# Patient Record
Sex: MALE | Race: BLACK OR AFRICAN AMERICAN | NOT HISPANIC OR LATINO | Employment: OTHER | URBAN - METROPOLITAN AREA
[De-identification: names, ages, dates, MRNs, and addresses within clinical notes are randomized per-mention and may not be internally consistent; named-entity substitution may affect disease eponyms.]

---

## 2019-05-23 ENCOUNTER — TELEPHONE (OUTPATIENT)
Dept: TRANSPLANT | Facility: CLINIC | Age: 64
End: 2019-05-23

## 2019-05-23 DIAGNOSIS — I50.9 CONGESTIVE HEART FAILURE, UNSPECIFIED HF CHRONICITY, UNSPECIFIED HEART FAILURE TYPE: Primary | ICD-10-CM

## 2019-05-23 NOTE — TELEPHONE ENCOUNTER
Called to schedule consult appointment. No answer. Unable to leave message as voicemail not set up yet. Records from referring provider scanned into [employer] and sent to be scanned into Epic.

## 2019-05-27 NOTE — TELEPHONE ENCOUNTER
REFERRAL NOTE:    Rigo Palencia has been referred to the pre-heart transplant office for consideration for orthotopic heart transplantation by KAMILA Odom. Patient's appointments have been scheduled for 07/22/19 as per patients request to come in at the end of July to allow himself time to arrange transportation either through insurance or son who is a . Sooner appointment offered. AHF/ Transplant Handout, appointment letter and campus map was mailed to the patient. My contact information was given. Call PRN. Dr Odom's office informed of appointment as scheduled with Dr Feldman via fax.

## 2019-07-22 ENCOUNTER — HOSPITAL ENCOUNTER (OUTPATIENT)
Dept: PULMONOLOGY | Facility: CLINIC | Age: 64
Discharge: HOME | End: 2019-07-22
Payer: MEDICARE

## 2019-07-22 ENCOUNTER — INITIAL CONSULT (OUTPATIENT)
Dept: TRANSPLANT | Facility: CLINIC | Age: 64
End: 2019-07-22
Payer: MEDICARE

## 2019-07-22 ENCOUNTER — HOSPITAL ENCOUNTER (OUTPATIENT)
Dept: CARDIOLOGY | Facility: CLINIC | Age: 64
Discharge: HOME | End: 2019-07-22
Attending: INTERNAL MEDICINE
Payer: MEDICARE

## 2019-07-22 VITALS
WEIGHT: 207 LBS | HEIGHT: 75 IN | HEIGHT: 75 IN | BODY MASS INDEX: 25.74 KG/M2 | OXYGEN SATURATION: 97 % | DIASTOLIC BLOOD PRESSURE: 66 MMHG | SYSTOLIC BLOOD PRESSURE: 99 MMHG | HEART RATE: 61 BPM | WEIGHT: 207.88 LBS | BODY MASS INDEX: 25.85 KG/M2

## 2019-07-22 VITALS
HEART RATE: 54 BPM | WEIGHT: 207 LBS | SYSTOLIC BLOOD PRESSURE: 118 MMHG | BODY MASS INDEX: 25.74 KG/M2 | HEIGHT: 75 IN | DIASTOLIC BLOOD PRESSURE: 75 MMHG

## 2019-07-22 DIAGNOSIS — Z95.810 BIVENTRICULAR AUTOMATIC IMPLANTABLE CARDIOVERTER DEFIBRILLATOR IN SITU: ICD-10-CM

## 2019-07-22 DIAGNOSIS — I50.9 CONGESTIVE HEART FAILURE, UNSPECIFIED HF CHRONICITY, UNSPECIFIED HEART FAILURE TYPE: ICD-10-CM

## 2019-07-22 DIAGNOSIS — I25.10 CORONARY ARTERY DISEASE INVOLVING NATIVE CORONARY ARTERY OF NATIVE HEART WITHOUT ANGINA PECTORIS: ICD-10-CM

## 2019-07-22 DIAGNOSIS — I50.42 CHRONIC COMBINED SYSTOLIC AND DIASTOLIC CONGESTIVE HEART FAILURE: Primary | ICD-10-CM

## 2019-07-22 DIAGNOSIS — I25.5 ISCHEMIC CARDIOMYOPATHY: ICD-10-CM

## 2019-07-22 DIAGNOSIS — I48.0 PAROXYSMAL ATRIAL FIBRILLATION: ICD-10-CM

## 2019-07-22 LAB
ASCENDING AORTA: 3.56 CM
AV INDEX (PROSTH): 0.5
AV MEAN GRADIENT: 4 MMHG
AV PEAK GRADIENT: 6 MMHG
AV VALVE AREA: 2.01 CM2
AV VELOCITY RATIO: 0.54
BSA FOR ECHO PROCEDURE: 2.23 M2
CV ECHO LV RWT: 0.22 CM
DOP CALC AO PEAK VEL: 1.23 M/S
DOP CALC AO VTI: 30.91 CM
DOP CALC LVOT AREA: 4 CM2
DOP CALC LVOT DIAMETER: 2.27 CM
DOP CALC LVOT PEAK VEL: 0.67 M/S
DOP CALC LVOT STROKE VOLUME: 62.13 CM3
DOP CALCLVOT PEAK VEL VTI: 15.36 CM
E WAVE DECELERATION TIME: 192.63 MSEC
E/A RATIO: 0.8
E/E' RATIO: 12.33 M/S
ECHO LV POSTERIOR WALL: 0.82 CM (ref 0.6–1.1)
FRACTIONAL SHORTENING: 4 % (ref 28–44)
INTERVENTRICULAR SEPTUM: 0.85 CM (ref 0.6–1.1)
LA MAJOR: 5.24 CM
LA MINOR: 5.35 CM
LA WIDTH: 4.09 CM
LEFT ATRIUM SIZE: 4.8 CM
LEFT ATRIUM VOLUME INDEX: 39.7 ML/M2
LEFT ATRIUM VOLUME: 88.35 CM3
LEFT INTERNAL DIMENSION IN SYSTOLE: 7 CM (ref 2.1–4)
LEFT VENTRICLE DIASTOLIC VOLUME INDEX: 125.98 ML/M2
LEFT VENTRICLE DIASTOLIC VOLUME: 280.51 ML
LEFT VENTRICLE MASS INDEX: 125 G/M2
LEFT VENTRICLE SYSTOLIC VOLUME INDEX: 93.4 ML/M2
LEFT VENTRICLE SYSTOLIC VOLUME: 207.97 ML
LEFT VENTRICULAR INTERNAL DIMENSION IN DIASTOLE: 7.3 CM (ref 3.5–6)
LEFT VENTRICULAR MASS: 277.42 G
LV LATERAL E/E' RATIO: 10.57 M/S
LV SEPTAL E/E' RATIO: 14.8 M/S
MV PEAK A VEL: 0.92 M/S
MV PEAK E VEL: 0.74 M/S
PISA TR MAX VEL: 2.65 M/S
PULM VEIN S/D RATIO: 1.34
PV PEAK D VEL: 0.32 M/S
PV PEAK S VEL: 0.43 M/S
RA MAJOR: 4.61 CM
RA PRESSURE: 3 MMHG
RA WIDTH: 3.58 CM
RIGHT VENTRICULAR END-DIASTOLIC DIMENSION: 3.54 CM
RV TISSUE DOPPLER FREE WALL SYSTOLIC VELOCITY 1 (APICAL 4 CHAMBER VIEW): 12.1 CM/S
SINUS: 3.81 CM
STJ: 3.81 CM
TDI LATERAL: 0.07 M/S
TDI SEPTAL: 0.05 M/S
TDI: 0.06 M/S
TR MAX PG: 28 MMHG
TRICUSPID ANNULAR PLANE SYSTOLIC EXCURSION: 1.51 CM
TV REST PULMONARY ARTERY PRESSURE: 31 MMHG

## 2019-07-22 PROCEDURE — 94618 PULMONARY STRESS TESTING: CPT | Mod: 26,S$PBB,NTX, | Performed by: INTERNAL MEDICINE

## 2019-07-22 PROCEDURE — 94618 PULMONARY STRESS TESTING: ICD-10-PCS | Mod: 26,S$PBB,NTX, | Performed by: INTERNAL MEDICINE

## 2019-07-22 PROCEDURE — 94618 PULMONARY STRESS TESTING: CPT | Mod: PBBFAC,NTX | Performed by: INTERNAL MEDICINE

## 2019-07-22 PROCEDURE — 99204 PR OFFICE/OUTPT VISIT, NEW, LEVL IV, 45-59 MIN: ICD-10-PCS | Mod: S$PBB,NTX,, | Performed by: INTERNAL MEDICINE

## 2019-07-22 PROCEDURE — 99204 OFFICE O/P NEW MOD 45 MIN: CPT | Mod: S$PBB,NTX,, | Performed by: INTERNAL MEDICINE

## 2019-07-22 PROCEDURE — 93306 TRANSTHORACIC ECHO (TTE) COMPLETE (CUPID ONLY): ICD-10-PCS | Mod: 26,S$PBB,NTX, | Performed by: INTERNAL MEDICINE

## 2019-07-22 PROCEDURE — 99999 PR PBB SHADOW E&M-EST. PATIENT-LVL III: CPT | Mod: PBBFAC,TXP,, | Performed by: INTERNAL MEDICINE

## 2019-07-22 PROCEDURE — 93306 TTE W/DOPPLER COMPLETE: CPT | Mod: PBBFAC,NTX | Performed by: INTERNAL MEDICINE

## 2019-07-22 PROCEDURE — 99999 PR PBB SHADOW E&M-EST. PATIENT-LVL III: ICD-10-PCS | Mod: PBBFAC,TXP,, | Performed by: INTERNAL MEDICINE

## 2019-07-22 PROCEDURE — 99213 OFFICE O/P EST LOW 20 MIN: CPT | Mod: PBBFAC,TXP | Performed by: INTERNAL MEDICINE

## 2019-07-22 RX ORDER — PREDNISONE 20 MG/1
20 TABLET ORAL DAILY
Refills: 3 | COMMUNITY
Start: 2019-06-22 | End: 2020-02-14 | Stop reason: DRUGHIGH

## 2019-07-22 RX ORDER — CARVEDILOL 12.5 MG/1
12.5 TABLET ORAL 2 TIMES DAILY
Refills: 3 | COMMUNITY
Start: 2019-06-21

## 2019-07-22 RX ORDER — SACUBITRIL AND VALSARTAN 24; 26 MG/1; MG/1
1 TABLET, FILM COATED ORAL DAILY
Refills: 3 | COMMUNITY
Start: 2019-06-19

## 2019-07-22 RX ORDER — ISOSORBIDE MONONITRATE 30 MG/1
30 TABLET, EXTENDED RELEASE ORAL DAILY
Refills: 3 | COMMUNITY
Start: 2019-06-21

## 2019-07-22 RX ORDER — LEVOTHYROXINE SODIUM 75 UG/1
75 TABLET ORAL
COMMUNITY

## 2019-07-22 RX ORDER — FUROSEMIDE 80 MG/1
80 TABLET ORAL 2 TIMES DAILY
Refills: 5 | COMMUNITY
Start: 2019-07-04 | End: 2019-08-07

## 2019-07-22 RX ORDER — APIXABAN 5 MG/1
5 TABLET, FILM COATED ORAL 2 TIMES DAILY
Refills: 3 | COMMUNITY
Start: 2019-06-19

## 2019-07-22 RX ORDER — PRAVASTATIN SODIUM 10 MG/1
10 TABLET ORAL DAILY
COMMUNITY
End: 2020-02-14 | Stop reason: DRUGHIGH

## 2019-07-22 RX ORDER — FUROSEMIDE 80 MG/1
40 TABLET ORAL EVERY OTHER DAY
COMMUNITY

## 2019-07-22 NOTE — LETTER
July 22, 2019        KAMILA Odom  [address] [place] [address]  Phone: [phone]  Fax: [phone]             [facility] [address]  [facility] [address]  Phone: [phone]   Patient: Rigo Palencia   MR Number: [id number]   YOB: 1955   Date of Visit: 7/22/2019       Dear Dr. KAMILA Odom    Thank you for referring Rigo Palencia to me for evaluation. Attached you will find relevant portions of my assessment and plan of care.    If you have questions, please do not hesitate to call me. I look forward to following Rigo Palencia along with you.    Sincerely,    Sunny Feldman MD    Enclosure    If you would like to receive this communication electronically, please contact [email or web address] or [phone] to request [employer] Link access.    [employer] Link is a tool which provides read-only access to select patient information with whom you have a relationship. Its easy to use and provides real time access to review your patients record including encounter summaries, notes, results, and demographic information.    If you feel you have received this communication in error or would no longer like to receive these types of communications, please e-mail [email or web address]

## 2019-07-22 NOTE — PROCEDURES
Rigo Palencia is a 63 y.o.  male patient, who presents for a 6 minute walk test ordered by MD Gaston.  The diagnosis is Pre Heart Transplant.  The patient's BMI is 25.9 kg/m2.  Predicted distance (lower limit of normal) is 404.48 meters.      Test Results:    The test was completed without stopping.    The total time walked was 360 seconds.  During walking, the patient reported:  Dyspnea, Leg pain. The patient used no assistive devices  during testing.     07/22/2019---------Distance: 365.76 meters (1200 feet)     O2 Sat % Supplemental Oxygen Heart Rate Blood Pressure Cici Scale   Pre-exercise  (Resting) 97 % Room Air 59 bpm 118/77 mmHg 2   During Exercise 95 % Room Air 69 bpm 125/66 mmHg 3   Post-exercise  (Recovery) 97 % Room Air  62 bpm   mmHg       Recovery Time: 84 seconds    Performing nurse/tech: Alex SANCHEZ      PREVIOUS STUDY:   The patient has not had a previous study.      CLINICAL INTERPRETATION:  Six minute walk distance is 365.76 meters (1200 feet) with moderate dyspnea.  During exercise, there was no significant desaturation while breathing room air.  Both blood pressure and heart rate remained stable with walking.  Bradycardia was present prior to exercise.  The patient reported non-pulmonary symptoms during exercise.  No previous study performed.  Based upon age and body mass index, exercise capacity is less than predicted.

## 2019-07-22 NOTE — PROGRESS NOTES
Lab order faxed to [facility] in [place] for BMP on 7/26.  Pt given copy of lab order as well.

## 2019-07-22 NOTE — PROGRESS NOTES
Subjective:   Transplant status: active    HPI:  Mr. Palencia is a 63 y.o. year old black male with stage C HFrEF, ICMP, CAD s/p PCI, BIV-ICD who is referred for evaluation for advanced HF options. Clinically reports NYHA class III symptoms. Occasional PND and orthopnea. His regimen includes; entresto 24/26 mg BID, coreg 12.5 mg BID and lasix 40 mg BID on MWF. His labs were reviewed today.     Past Medical History:   Diagnosis Date    Biventricular automatic implantable cardioverter defibrillator in situ 7/22/2019    CAD (coronary artery disease)     Chronic combined systolic and diastolic congestive heart failure     Coronary artery disease involving native coronary artery of native heart without angina pectoris 7/22/2019    Dizziness and giddiness     Fatigue     Heart attack 2018    High cholesterol     History of retroperitoneal fibrosis     HTN (hypertension)     ICD (implantable cardioverter-defibrillator) battery depletion     Ischemic cardiomyopathy     Ischemic cardiomyopathy 7/22/2019    Kidney disease, chronic, stage II (mild, EGFR 60+ ml/min) 03/10/2011    Kidney disease, chronic, stage III (GFR 30-59 ml/min) 03/2011    Long term current use of antiarrhythmic drug     Paroxysmal atrial fibrillation     Paroxysmal atrial fibrillation 7/22/2019    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 Negative for chills, decreased appetite, diaphoresis, fever, malaise/fatigue, night sweats, weight gain and weight loss.   Eyes: Negative.    Cardiovascular: Positive for dyspnea on exertion. Negative for chest pain, claudication, cyanosis, irregular heartbeat, leg swelling, near-syncope, orthopnea, palpitations, paroxysmal  "nocturnal dyspnea and syncope.   Respiratory: Negative for cough, hemoptysis and shortness of breath.    Endocrine: Negative.    Hematologic/Lymphatic: Negative.    Skin: Negative for color change, dry skin and nail changes.   Musculoskeletal: Negative.    Gastrointestinal: Negative.    Genitourinary: Negative.    Neurological: Negative for weakness.       Objective:   Blood pressure 99/66, pulse 61, height 6' 2.5" (1.892 m), weight 94.3 kg (207 lb 14.3 oz), SpO2 97 %.body mass index is 26.33 kg/m².  Physical Exam   Constitutional: He appears well-developed.   BP 99/66 (BP Location: Left arm, Patient Position: Sitting, BP Method: Medium (Automatic))   Pulse 61   Ht 6' 2.5" (1.892 m)   Wt 94.3 kg (207 lb 14.3 oz)   SpO2 97%   BMI 26.33 kg/m²      HENT:   Head: Normocephalic.   Neck: No JVD present. Carotid bruit is not present.   Cardiovascular: Regular rhythm, normal heart sounds and normal pulses.   No murmur heard.  Pulmonary/Chest: Effort normal and breath sounds normal. No respiratory distress. He has no wheezes. He has no rales.   Abdominal: Soft. Bowel sounds are normal. He exhibits no distension. There is no tenderness. There is no rebound.   Musculoskeletal: He exhibits no edema.   Neurological: He is alert.   Skin: Skin is warm.   Vitals reviewed.      Labs:    Chemistry        Component Value Date/Time     07/22/2019 1033    K 3.5 07/22/2019 1033     07/22/2019 1033    CO2 25 07/22/2019 1033    BUN 26 (H) 07/22/2019 1033    CREATININE 2.3 (H) 07/22/2019 1033     (H) 07/22/2019 1033        Component Value Date/Time    CALCIUM 9.1 07/22/2019 1033    ESTGFRAFRICA 33.7 (A) 07/22/2019 1033    EGFRNONAA 29.1 (A) 07/22/2019 1033          No results found for: MG  Lab Results   Component Value Date    WBC 5.22 07/22/2019    HGB 11.6 (L) 07/22/2019    HCT 39.7 (L) 07/22/2019     07/22/2019     No results found for: INR, PROTIME  BNP   Date Value Ref Range Status   07/22/2019 1,009 (H) " 0 - 99 pg/mL Final     Comment:     Values of less than 100 pg/ml are consistent with non-CHF populations.       Assessment:      1. Ischemic cardiomyopathy    2. Coronary artery disease involving native coronary artery of native heart without angina pectoris    3. Biventricular automatic implantable cardioverter defibrillator in situ    4. Paroxysmal atrial fibrillation        Plan:   In summary Mr. Palencia is a very pleasant 62 y/o black male with stage C HFrEF, ICMP, NYHA class III symptoms with recent HF admissions.   Recommend 2D echo with CFD to assess his LV size and function and filling pressures  I am worried about his worsening kidney function. he appears euvolemic so will decrease his lasix to 40 mg.  Will review his echo and give further recs.  Recommend 2 gram sodium restriction and 1500cc fluid restriction.  Encourage physical activity with graded exercise program.  Requested patient to weigh themselves daily, and to notify us if their weight increases by more than 3 lbs in 1 day or 5 lbs in 1 week.   BMP Friday to be done locally.   RTC in 12 weeks.    Sunny Feldman MD

## 2019-07-26 DIAGNOSIS — I50.42 CHRONIC COMBINED SYSTOLIC AND DIASTOLIC CONGESTIVE HEART FAILURE: Primary | ICD-10-CM

## 2019-07-26 RX ORDER — POTASSIUM CHLORIDE 20 MEQ/1
40 TABLET, EXTENDED RELEASE ORAL 2 TIMES DAILY
Qty: 150 TABLET | Refills: 6 | Status: SHIPPED | OUTPATIENT
Start: 2019-07-26

## 2019-07-26 NOTE — TELEPHONE ENCOUNTER
Reviewed outside labs 7/25/19 with Dr Feldman.  Creat down slightly to 2.1.  K low at 3.3    Per Dr Feldman , pt to continue with lasix 40 every other day.   Order received for KCL 60meq today then 40meq bid starting tomorrow.  rx entered and sent to MD for signaure.     Instructed pt on med changes. He voiced understanding by repeating directions.

## 2019-08-07 ENCOUNTER — RESEARCH ENCOUNTER (OUTPATIENT)
Dept: RESEARCH | Facility: HOSPITAL | Age: 64
End: 2019-08-07

## 2019-08-07 ENCOUNTER — OFFICE VISIT (OUTPATIENT)
Dept: TRANSPLANT | Facility: CLINIC | Age: 64
End: 2019-08-07
Payer: MEDICARE

## 2019-08-07 VITALS
HEART RATE: 58 BPM | SYSTOLIC BLOOD PRESSURE: 122 MMHG | BODY MASS INDEX: 26.42 KG/M2 | WEIGHT: 212.5 LBS | DIASTOLIC BLOOD PRESSURE: 78 MMHG | HEIGHT: 75 IN

## 2019-08-07 DIAGNOSIS — I50.42 CHRONIC COMBINED SYSTOLIC AND DIASTOLIC CONGESTIVE HEART FAILURE: Primary | ICD-10-CM

## 2019-08-07 DIAGNOSIS — I25.5 ISCHEMIC CARDIOMYOPATHY: Primary | ICD-10-CM

## 2019-08-07 DIAGNOSIS — Z95.810 BIVENTRICULAR AUTOMATIC IMPLANTABLE CARDIOVERTER DEFIBRILLATOR IN SITU: ICD-10-CM

## 2019-08-07 DIAGNOSIS — I48.0 PAROXYSMAL ATRIAL FIBRILLATION: ICD-10-CM

## 2019-08-07 DIAGNOSIS — I25.5 ISCHEMIC CARDIOMYOPATHY: ICD-10-CM

## 2019-08-07 DIAGNOSIS — I25.10 CORONARY ARTERY DISEASE INVOLVING NATIVE CORONARY ARTERY OF NATIVE HEART WITHOUT ANGINA PECTORIS: ICD-10-CM

## 2019-08-07 PROCEDURE — 99213 OFFICE O/P EST LOW 20 MIN: CPT | Mod: PBBFAC,NTX | Performed by: INTERNAL MEDICINE

## 2019-08-07 PROCEDURE — 99999 PR PBB SHADOW E&M-EST. PATIENT-LVL III: ICD-10-PCS | Mod: PBBFAC,TXP,, | Performed by: INTERNAL MEDICINE

## 2019-08-07 PROCEDURE — 99999 PR PBB SHADOW E&M-EST. PATIENT-LVL III: CPT | Mod: PBBFAC,TXP,, | Performed by: INTERNAL MEDICINE

## 2019-08-07 PROCEDURE — 99214 PR OFFICE/OUTPT VISIT, EST, LEVL IV, 30-39 MIN: ICD-10-PCS | Mod: S$PBB,,, | Performed by: INTERNAL MEDICINE

## 2019-08-07 PROCEDURE — 99214 OFFICE O/P EST MOD 30 MIN: CPT | Mod: S$PBB,,, | Performed by: INTERNAL MEDICINE

## 2019-08-07 NOTE — PROGRESS NOTES
Date Consent signed: 8/7/2019     Sponsor: [employer]     Study Title/IRB Number: [id number]     Principal Investigator: Sunny Feldman MD     Present for Discussion: CRC, patient, patient's wife      Is LAR Consenting for Subject: No     Prior to the Informed Consent (IC) being signed, or any study protocol required data collection, testing, procedure, or intervention being performed, the following was done and/or discussed:  · Patient was given a copy of the IC for review   · Purpose of the study and qualifications to participate   · Study design, Follow up schedule, and tests or procedures done at each visit  · Confidentiality and HIPAA Authorization for Release of Medical Records for the research trial/ subject's rights/research related injury  · Risk, Benefits, Alternative Treatments, Compensation and Costs  · Participation in the research trial is voluntary and patient may withdraw at anytime  · Contact information for study related questions     Patient verbalizes understanding of the above: Yes  Contact information for CRC and PI given to patient: Yes  Patient able to adequately summarize: the purpose of the study, the risks associated with the study, and all procedures, testing, and follow-ups associated with the study: Yes     Mr. Palencia signed the informed consent form for the GUIDE-MARIOLA research study with an IRB approval date of 4/3/2018. Each page of the consent form was reviewed with patient and patient's wife, and all questions answered satisfactorily. Mr. Palencia signed the consent form and received a copy of same. The original consent was scanned into electronic medical records (EPIC) and filed into the subject's research study binder.     Screening/baseline procedures initiated per protocol following obtaining consent. EQ-5D-5L and KCCQ-12 questionnaires completed prior to all screening/baseline procedures per protocol. Inclusion/exclusion reviewed and confirmed with Dr. Feldman. Medical history and  heart failure medications reviewed and confirmed. Six minute landa walk test conducted by CRC per protocol. Patient walked a total of 240 meters and had to stop prior to completion of the 6 minutes. Patient walked for a total of 3 minutes and 35 seconds. Patient denied complaints before the test, but reported shortness of breath and fatigue during and after test and vital signs remained stable (Before: BP 138/63 HR 55 O2 97% on room air; After: BP 132/64 HR 61 O2 96% on room air). Remaining baseline procedures (HF exam with NYHA and VS and labs) will be performed on the day of implant upon checking into [facility]. Confirmed with patient that Medicare is primary insurance, therefore, pre-authorization is not required for the study implant. However, Verification of Benefits to be obtained with  to contact patient prior to procedure to notify patient of financial obligation. Complete understanding verbalized, and patient in agreement with plan. Patient to be scheduled for implant once  contacts patient. Patient in agreement with plan and understanding verbalized. CRC contact information given to patient. Instructed patient to call with any questions or concerns. Understanding verbalized. Will follow.

## 2019-08-07 NOTE — PROGRESS NOTES
Subjective:   Transplant status: active    HPI:  Mr. Palencia is a 63 y.o. year old black male with stage C HFrEF, ICMP, CAD s/p PCI, BIV-ICD who comes for a follow-up visit. Clinically reports NYHA class III symptoms. Occasional PND and orthopnea. His regimen includes; entresto 24/26 mg BID, coreg 12.5 mg BID and lasix 40 mg BID on MWF. His labs were reviewed today.     Past Medical History:   Diagnosis Date    Biventricular automatic implantable cardioverter defibrillator in situ 7/22/2019    CAD (coronary artery disease)     Chronic combined systolic and diastolic congestive heart failure     Coronary artery disease involving native coronary artery of native heart without angina pectoris 7/22/2019    Dizziness and giddiness     Fatigue     Heart attack 2018    High cholesterol     History of retroperitoneal fibrosis     HTN (hypertension)     ICD (implantable cardioverter-defibrillator) battery depletion     Ischemic cardiomyopathy     Ischemic cardiomyopathy 7/22/2019    Kidney disease, chronic, stage II (mild, EGFR 60+ ml/min) 03/10/2011    Kidney disease, chronic, stage III (GFR 30-59 ml/min) 03/2011    Long term current use of antiarrhythmic drug     Paroxysmal atrial fibrillation     Paroxysmal atrial fibrillation 7/22/2019    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 Negative for chills, decreased appetite, diaphoresis, fever, malaise/fatigue, night sweats, weight gain and weight loss.   Eyes: Negative.    Cardiovascular: Negative for chest pain, claudication, cyanosis, dyspnea on exertion, irregular heartbeat, leg swelling, near-syncope, orthopnea, palpitations, paroxysmal nocturnal dyspnea and syncope.  "  Respiratory: Negative for cough, hemoptysis and shortness of breath.    Endocrine: Negative.    Hematologic/Lymphatic: Negative.    Skin: Negative for color change, dry skin and nail changes.   Musculoskeletal: Negative.    Gastrointestinal: Negative.    Genitourinary: Negative.    Neurological: Negative for weakness.       Objective:   Blood pressure 122/78, pulse (!) 58, height 6' 3" (1.905 m), weight 96.4 kg (212 lb 8.4 oz).body mass index is 26.56 kg/m².  Physical Exam   Constitutional: He appears well-developed.   /78 (BP Location: Left arm, Patient Position: Sitting, BP Method: Medium (Automatic))   Pulse (!) 58   Ht 6' 3" (1.905 m)   Wt 96.4 kg (212 lb 8.4 oz)   BMI 26.56 kg/m²      HENT:   Head: Normocephalic.   Neck: No JVD present. Carotid bruit is not present.   Cardiovascular: Regular rhythm, normal heart sounds and normal pulses.   No murmur heard.  Pulmonary/Chest: Effort normal and breath sounds normal.   Abdominal: Soft. Bowel sounds are normal.   Neurological: He is alert.   Skin: Skin is warm.   Vitals reviewed.      Labs:    Chemistry        Component Value Date/Time     08/07/2019 1020    K 4.2 08/07/2019 1020     08/07/2019 1020    CO2 25 08/07/2019 1020    BUN 31 (H) 08/07/2019 1020    CREATININE 2.1 (H) 08/07/2019 1020    GLU 96 08/07/2019 1020        Component Value Date/Time    CALCIUM 9.1 07/22/2019 1033    ESTGFRAFRICA 33.7 (A) 07/22/2019 1033    EGFRNONAA 29.1 (A) 07/22/2019 1033          No results found for: MG  Lab Results   Component Value Date    WBC 5.22 07/22/2019    HGB 11.6 (L) 07/22/2019    HCT 39.7 (L) 07/22/2019     07/22/2019     No results found for: INR, PROTIME  BNP   Date Value Ref Range Status   07/22/2019 1,009 (H) 0 - 99 pg/mL Final     Comment:     Values of less than 100 pg/ml are consistent with non-CHF populations.       Assessment:      1. Chronic combined systolic and diastolic congestive heart failure    2. Coronary artery disease " involving native coronary artery of native heart without angina pectoris    3. Ischemic cardiomyopathy    4. Biventricular automatic implantable cardioverter defibrillator in situ    5. Paroxysmal atrial fibrillation        Plan:   In summary Mr. Paelncia is a very pleasant 62 y/o black male with stage C HFrEF, ICMP, NYHA class III symptoms with elevated BNP.  In view of his NYHA class symptoms and elevated BNP he meets criteria for GUIDE HF study. He has been on a stable HF regimen and does not need advanced HF options at this time.   May move to HF clinic  Patient met with our CRC Naz Benitez to go over the study protocol  Recommend 2 gram sodium restriction and 1500cc fluid restriction.  Encourage physical activity with graded exercise program.  Requested patient to weigh themselves daily, and to notify us if their weight increases by more than 3 lbs in 1 day or 5 lbs in 1 week.   RTC in 2 months      Sunny Feldman MD

## 2019-08-07 NOTE — LETTER
August 7, 2019        KAMILA Odom  [address] [facility] [address]  Phone: [phone]  Fax: [phone]             [facility] [address]  [facility] [address]  Phone: [phone]   Patient: Rigo Palencia   MR Number: [id number]   YOB: 1955   Date of Visit: 8/7/2019       Dear Dr. KAMILA Odom    Thank you for referring Rigo Palencia to me for evaluation. Attached you will find relevant portions of my assessment and plan of care.    If you have questions, please do not hesitate to call me. I look forward to following Rigo Palencia along with you.    Sincerely,    Sunny Feldman MD    Enclosure    If you would like to receive this communication electronically, please contact [email or web address] or [phone] to request [employer] access.    [employer] is a tool which provides read-only access to select patient information with whom you have a relationship. Its easy to use and provides real time access to review your patients record including encounter summaries, notes, results, and demographic information.    If you feel you have received this communication in error or would no longer like to receive these types of communications, please e-mail [email or web address]

## 2019-08-12 ENCOUNTER — RESEARCH ENCOUNTER (OUTPATIENT)
Dept: RESEARCH | Facility: HOSPITAL | Age: 64
End: 2019-08-12

## 2019-08-12 NOTE — PROGRESS NOTES
Patient withdrawn from participating in the GUIDE-HF clinic trial. Patient had signed consent on 8/7/2019, however, decided that he did not want to proceed with the implant. Patient informed that he would be withdrawn from study, and study participation is complete. Patient in agreement and complete understanding verbalized. Patient denies any questions or concerns. Patient withdrawn from study in Epic and On[employer]. Dr. Feldman notified.

## 2019-10-02 ENCOUNTER — OFFICE VISIT (OUTPATIENT)
Dept: TRANSPLANT | Facility: CLINIC | Age: 64
End: 2019-10-02
Payer: MEDICARE

## 2019-10-02 ENCOUNTER — LAB VISIT (OUTPATIENT)
Dept: LAB | Facility: HOSPITAL | Age: 64
End: 2019-10-02
Attending: INTERNAL MEDICINE
Payer: MEDICARE

## 2019-10-02 VITALS
DIASTOLIC BLOOD PRESSURE: 68 MMHG | HEART RATE: 60 BPM | HEIGHT: 75 IN | SYSTOLIC BLOOD PRESSURE: 116 MMHG | WEIGHT: 213.19 LBS | BODY MASS INDEX: 26.51 KG/M2 | OXYGEN SATURATION: 94 %

## 2019-10-02 DIAGNOSIS — I50.42 CHRONIC COMBINED SYSTOLIC AND DIASTOLIC CONGESTIVE HEART FAILURE: Primary | ICD-10-CM

## 2019-10-02 DIAGNOSIS — Z95.810 BIVENTRICULAR AUTOMATIC IMPLANTABLE CARDIOVERTER DEFIBRILLATOR IN SITU: ICD-10-CM

## 2019-10-02 DIAGNOSIS — I25.5 ISCHEMIC CARDIOMYOPATHY: ICD-10-CM

## 2019-10-02 DIAGNOSIS — I25.10 CORONARY ARTERY DISEASE INVOLVING NATIVE CORONARY ARTERY OF NATIVE HEART WITHOUT ANGINA PECTORIS: ICD-10-CM

## 2019-10-02 LAB
ALBUMIN SERPL BCP-MCNC: 3.6 G/DL (ref 3.5–5.2)
ALP SERPL-CCNC: 77 U/L (ref 55–135)
ALT SERPL W/O P-5'-P-CCNC: 10 U/L (ref 10–44)
ANION GAP SERPL CALC-SCNC: 12 MMOL/L (ref 8–16)
AST SERPL-CCNC: 11 U/L (ref 10–40)
BASOPHILS # BLD AUTO: 0.02 K/UL (ref 0–0.2)
BASOPHILS NFR BLD: 0.4 % (ref 0–1.9)
BILIRUB SERPL-MCNC: 0.3 MG/DL (ref 0.1–1)
BNP SERPL-MCNC: 716 PG/ML (ref 0–99)
BUN SERPL-MCNC: 32 MG/DL (ref 8–23)
CALCIUM SERPL-MCNC: 8.9 MG/DL (ref 8.7–10.5)
CHLORIDE SERPL-SCNC: 107 MMOL/L (ref 95–110)
CO2 SERPL-SCNC: 26 MMOL/L (ref 23–29)
CREAT SERPL-MCNC: 2.4 MG/DL (ref 0.5–1.4)
DIFFERENTIAL METHOD: ABNORMAL
EOSINOPHIL # BLD AUTO: 0.1 K/UL (ref 0–0.5)
EOSINOPHIL NFR BLD: 0.9 % (ref 0–8)
ERYTHROCYTE [DISTWIDTH] IN BLOOD BY AUTOMATED COUNT: 17.7 % (ref 11.5–14.5)
EST. GFR  (AFRICAN AMERICAN): 32 ML/MIN/1.73 M^2
EST. GFR  (NON AFRICAN AMERICAN): 27.7 ML/MIN/1.73 M^2
GLUCOSE SERPL-MCNC: 106 MG/DL (ref 70–110)
HCT VFR BLD AUTO: 38.9 % (ref 40–54)
HGB BLD-MCNC: 12.1 G/DL (ref 14–18)
IMM GRANULOCYTES # BLD AUTO: 0.02 K/UL (ref 0–0.04)
IMM GRANULOCYTES NFR BLD AUTO: 0.4 % (ref 0–0.5)
LYMPHOCYTES # BLD AUTO: 1.5 K/UL (ref 1–4.8)
LYMPHOCYTES NFR BLD: 26.7 % (ref 18–48)
MCH RBC QN AUTO: 26.9 PG (ref 27–31)
MCHC RBC AUTO-ENTMCNC: 31.1 G/DL (ref 32–36)
MCV RBC AUTO: 87 FL (ref 82–98)
MONOCYTES # BLD AUTO: 0.5 K/UL (ref 0.3–1)
MONOCYTES NFR BLD: 9.1 % (ref 4–15)
NEUTROPHILS # BLD AUTO: 3.6 K/UL (ref 1.8–7.7)
NEUTROPHILS NFR BLD: 62.5 % (ref 38–73)
NRBC BLD-RTO: 0 /100 WBC
PLATELET # BLD AUTO: 170 K/UL (ref 150–350)
PMV BLD AUTO: 11.7 FL (ref 9.2–12.9)
POTASSIUM SERPL-SCNC: 3.4 MMOL/L (ref 3.5–5.1)
PROT SERPL-MCNC: 6.9 G/DL (ref 6–8.4)
RBC # BLD AUTO: 4.49 M/UL (ref 4.6–6.2)
SODIUM SERPL-SCNC: 145 MMOL/L (ref 136–145)
WBC # BLD AUTO: 5.69 K/UL (ref 3.9–12.7)

## 2019-10-02 PROCEDURE — 99999 PR PBB SHADOW E&M-EST. PATIENT-LVL III: CPT | Mod: PBBFAC,,, | Performed by: INTERNAL MEDICINE

## 2019-10-02 PROCEDURE — 80053 COMPREHEN METABOLIC PANEL: CPT

## 2019-10-02 PROCEDURE — 83880 ASSAY OF NATRIURETIC PEPTIDE: CPT

## 2019-10-02 PROCEDURE — 99999 PR PBB SHADOW E&M-EST. PATIENT-LVL III: ICD-10-PCS | Mod: PBBFAC,,, | Performed by: INTERNAL MEDICINE

## 2019-10-02 PROCEDURE — 85025 COMPLETE CBC W/AUTO DIFF WBC: CPT

## 2019-10-02 PROCEDURE — 36415 COLL VENOUS BLD VENIPUNCTURE: CPT

## 2019-10-02 PROCEDURE — 99214 OFFICE O/P EST MOD 30 MIN: CPT | Mod: S$PBB,,, | Performed by: INTERNAL MEDICINE

## 2019-10-02 PROCEDURE — 99214 PR OFFICE/OUTPT VISIT, EST, LEVL IV, 30-39 MIN: ICD-10-PCS | Mod: S$PBB,,, | Performed by: INTERNAL MEDICINE

## 2019-10-02 PROCEDURE — 99213 OFFICE O/P EST LOW 20 MIN: CPT | Mod: PBBFAC | Performed by: INTERNAL MEDICINE

## 2019-10-02 NOTE — LETTER
October 2, 2019        KAMILA Odom  [address] [facility] [address]  Phone: [phone]  Fax: [phone]             [facility] [address]  [facility] [address]  Phone: [phone]   Patient: Rigo Palencia   MR Number: [id number]   YOB: 1955   Date of Visit: 10/2/2019       Dear Dr. KAMILA Odom    Thank you for referring Rigo Palencia to me for evaluation. Attached you will find relevant portions of my assessment and plan of care.    If you have questions, please do not hesitate to call me. I look forward to following Rigo Palencia along with you.    Sincerely,    Sunny Feldman MD    Enclosure    If you would like to receive this communication electronically, please contact [email or web address] or [phone] to request [employer] Link access.    [employer] Link is a tool which provides read-only access to select patient information with whom you have a relationship. Its easy to use and provides real time access to review your patients record including encounter summaries, notes, results, and demographic information.    If you feel you have received this communication in error or would no longer like to receive these types of communications, please e-mail [email or web address]

## 2019-10-02 NOTE — PROGRESS NOTES
Subjective:     HPI:  Mr. Palencia is a 63 y.o. year old black male with stage C HFrEF, ICMP, CAD s/p PCI, BIV-ICD who comes for a follow-up visit. Clinically reports NYHA class III symptoms. Occasional PND and orthopnea. His regimen includes; entresto 24/26 mg BID, coreg 12.5 mg BID and lasix 40 mg BID on MWF. His labs are still pending.     Past Medical History:   Diagnosis Date    Biventricular automatic implantable cardioverter defibrillator in situ 7/22/2019    CAD (coronary artery disease)     Chronic combined systolic and diastolic congestive heart failure     Coronary artery disease involving native coronary artery of native heart without angina pectoris 7/22/2019    Dizziness and giddiness     Fatigue     Heart attack 2018    High cholesterol     History of retroperitoneal fibrosis     HTN (hypertension)     ICD (implantable cardioverter-defibrillator) battery depletion     Ischemic cardiomyopathy     Ischemic cardiomyopathy 7/22/2019    Kidney disease, chronic, stage II (mild, EGFR 60+ ml/min) 03/10/2011    Kidney disease, chronic, stage III (GFR 30-59 ml/min) 03/2011    Long term current use of antiarrhythmic drug     Paroxysmal atrial fibrillation     Paroxysmal atrial fibrillation 7/22/2019    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 Negative for chills, decreased appetite, diaphoresis, fever, malaise/fatigue, night sweats, weight gain and weight loss.   Eyes: Negative.    Cardiovascular: Positive for dyspnea on exertion. Negative for chest pain, claudication, cyanosis, irregular heartbeat, leg swelling, near-syncope, orthopnea, palpitations, paroxysmal nocturnal dyspnea and syncope.   Respiratory: Negative for  "cough, hemoptysis and shortness of breath.    Endocrine: Negative.    Hematologic/Lymphatic: Negative.    Skin: Negative for color change, dry skin and nail changes.   Musculoskeletal: Negative.    Gastrointestinal: Negative.    Genitourinary: Negative.    Neurological: Negative for weakness.       Objective:   Blood pressure 116/68, pulse 60, height 6' 3" (1.905 m), weight 96.7 kg (213 lb 3 oz), SpO2 (!) 94 %.body mass index is 26.65 kg/m².  Physical Exam   Constitutional: He appears well-developed.   /68 (BP Location: Left arm, Patient Position: Sitting, BP Method: Large (Automatic))   Pulse 60   Ht 6' 3" (1.905 m)   Wt 96.7 kg (213 lb 3 oz)   SpO2 (!) 94%   BMI 26.65 kg/m²      HENT:   Head: Normocephalic.   Neck: No JVD present. Carotid bruit is not present.   Cardiovascular: Regular rhythm, normal heart sounds and normal pulses. PMI is displaced.   No murmur heard.  Pulmonary/Chest: Effort normal and breath sounds normal. No respiratory distress. He has no wheezes. He has no rales.   Abdominal: Soft. Bowel sounds are normal. He exhibits no distension. There is no tenderness. There is no rebound.   Musculoskeletal: He exhibits no edema.   Neurological: He is alert.   Skin: Skin is warm.   Vitals reviewed.      Labs:    Chemistry        Component Value Date/Time     08/07/2019 1020    K 4.2 08/07/2019 1020     08/07/2019 1020    CO2 25 08/07/2019 1020    BUN 31 (H) 08/07/2019 1020    CREATININE 2.1 (H) 08/07/2019 1020    GLU 96 08/07/2019 1020        Component Value Date/Time    CALCIUM 8.9 08/07/2019 1020    ESTGFRAFRICA 37.6 (A) 08/07/2019 1020    EGFRNONAA 32.5 (A) 08/07/2019 1020          No results found for: MG  Lab Results   Component Value Date    WBC 5.69 10/02/2019    HGB 12.1 (L) 10/02/2019    HCT 38.9 (L) 10/02/2019     10/02/2019     No results found for: INR, PROTIME  BNP   Date Value Ref Range Status   08/07/2019 1,350 (H) 0 - 99 pg/mL Final     Comment:     Values of " less than 100 pg/ml are consistent with non-CHF populations.   07/22/2019 1,009 (H) 0 - 99 pg/mL Final     Comment:     Values of less than 100 pg/ml are consistent with non-CHF populations.       Assessment:      1. Chronic combined systolic and diastolic congestive heart failure    2. Coronary artery disease involving native coronary artery of native heart without angina pectoris    3. Ischemic cardiomyopathy    4. Biventricular automatic implantable cardioverter defibrillator in situ        Plan:   Clinically doing well. NYHA class II-III symptoms. Euvolemic on exam.   Continue current HF regimen   Recommend 2 gram sodium restriction and 1500cc fluid restriction.  Encourage physical activity with graded exercise program.  Requested patient to weigh themselves daily, and to notify us if their weight increases by more than 3 lbs in 1 day or 5 lbs in 1 week.   RTC in 3 months with labs.     Sunny Feldman MD

## 2020-02-14 ENCOUNTER — OFFICE VISIT (OUTPATIENT)
Dept: TRANSPLANT | Facility: CLINIC | Age: 65
End: 2020-02-14
Payer: MEDICARE

## 2020-02-14 ENCOUNTER — LAB VISIT (OUTPATIENT)
Dept: LAB | Facility: HOSPITAL | Age: 65
End: 2020-02-14
Attending: INTERNAL MEDICINE
Payer: MEDICARE

## 2020-02-14 VITALS
HEIGHT: 75 IN | HEART RATE: 54 BPM | BODY MASS INDEX: 26.67 KG/M2 | WEIGHT: 214.5 LBS | SYSTOLIC BLOOD PRESSURE: 137 MMHG | DIASTOLIC BLOOD PRESSURE: 84 MMHG

## 2020-02-14 DIAGNOSIS — I50.42 CHRONIC COMBINED SYSTOLIC AND DIASTOLIC CONGESTIVE HEART FAILURE: Primary | ICD-10-CM

## 2020-02-14 DIAGNOSIS — I50.42 CHRONIC COMBINED SYSTOLIC AND DIASTOLIC CONGESTIVE HEART FAILURE: ICD-10-CM

## 2020-02-14 DIAGNOSIS — I25.10 CORONARY ARTERY DISEASE INVOLVING NATIVE CORONARY ARTERY OF NATIVE HEART WITHOUT ANGINA PECTORIS: ICD-10-CM

## 2020-02-14 DIAGNOSIS — I48.0 PAROXYSMAL ATRIAL FIBRILLATION: ICD-10-CM

## 2020-02-14 DIAGNOSIS — Z95.810 BIVENTRICULAR AUTOMATIC IMPLANTABLE CARDIOVERTER DEFIBRILLATOR IN SITU: ICD-10-CM

## 2020-02-14 DIAGNOSIS — I25.5 ISCHEMIC CARDIOMYOPATHY: ICD-10-CM

## 2020-02-14 LAB
ALBUMIN SERPL BCP-MCNC: 3.5 G/DL (ref 3.5–5.2)
ALP SERPL-CCNC: 73 U/L (ref 55–135)
ALT SERPL W/O P-5'-P-CCNC: 9 U/L (ref 10–44)
ANION GAP SERPL CALC-SCNC: 11 MMOL/L (ref 8–16)
AST SERPL-CCNC: 13 U/L (ref 10–40)
BILIRUB SERPL-MCNC: 0.3 MG/DL (ref 0.1–1)
BUN SERPL-MCNC: 27 MG/DL (ref 8–23)
CALCIUM SERPL-MCNC: 9.3 MG/DL (ref 8.7–10.5)
CHLORIDE SERPL-SCNC: 108 MMOL/L (ref 95–110)
CO2 SERPL-SCNC: 24 MMOL/L (ref 23–29)
CREAT SERPL-MCNC: 2.1 MG/DL (ref 0.5–1.4)
EST. GFR  (AFRICAN AMERICAN): 37.3 ML/MIN/1.73 M^2
EST. GFR  (NON AFRICAN AMERICAN): 32.3 ML/MIN/1.73 M^2
GLUCOSE SERPL-MCNC: 90 MG/DL (ref 70–110)
POTASSIUM SERPL-SCNC: 4.3 MMOL/L (ref 3.5–5.1)
PROT SERPL-MCNC: 6.8 G/DL (ref 6–8.4)
SODIUM SERPL-SCNC: 143 MMOL/L (ref 136–145)

## 2020-02-14 PROCEDURE — 99999 PR PBB SHADOW E&M-EST. PATIENT-LVL III: ICD-10-PCS | Mod: PBBFAC,,, | Performed by: INTERNAL MEDICINE

## 2020-02-14 PROCEDURE — 99214 OFFICE O/P EST MOD 30 MIN: CPT | Mod: S$PBB,,, | Performed by: INTERNAL MEDICINE

## 2020-02-14 PROCEDURE — 99214 PR OFFICE/OUTPT VISIT, EST, LEVL IV, 30-39 MIN: ICD-10-PCS | Mod: S$PBB,,, | Performed by: INTERNAL MEDICINE

## 2020-02-14 PROCEDURE — 80053 COMPREHEN METABOLIC PANEL: CPT

## 2020-02-14 PROCEDURE — 99999 PR PBB SHADOW E&M-EST. PATIENT-LVL III: CPT | Mod: PBBFAC,,, | Performed by: INTERNAL MEDICINE

## 2020-02-14 PROCEDURE — 36415 COLL VENOUS BLD VENIPUNCTURE: CPT

## 2020-02-14 PROCEDURE — 99213 OFFICE O/P EST LOW 20 MIN: CPT | Mod: PBBFAC | Performed by: INTERNAL MEDICINE

## 2020-02-14 RX ORDER — PRAVASTATIN SODIUM 20 MG/1
20 TABLET ORAL DAILY
COMMUNITY
Start: 2019-12-19

## 2020-02-14 RX ORDER — PREDNISONE 10 MG/1
10 TABLET ORAL DAILY
COMMUNITY
Start: 2019-11-25

## 2020-02-14 RX ORDER — AMIODARONE HYDROCHLORIDE 200 MG/1
200 TABLET ORAL DAILY
COMMUNITY
Start: 2020-01-06

## 2020-02-14 RX ORDER — PREDNISOLONE ACETATE 10 MG/ML
SUSPENSION/ DROPS OPHTHALMIC
COMMUNITY
Start: 2020-02-03

## 2020-02-14 NOTE — PROGRESS NOTES
Subjective:     HPI:  Mr. Palencia is a 63 y.o. year old black male with stage C HFrEF, ICMP, CAD s/p PCI, BIV-ICD who comes for a follow-up visit. Clinically continues to do well. Reports NYHA class II-III symptoms. Occasional PND and orthopnea. His regimen includes; entresto 24/26 mg BID, coreg 12.5 mg BID and lasix 40 mg BID on MWF.  Her reports no recent HF admissions or ED visits. Labs today are still pending.     Past Medical History:   Diagnosis Date    Biventricular automatic implantable cardioverter defibrillator in situ 7/22/2019    CAD (coronary artery disease)     Chronic combined systolic and diastolic congestive heart failure     Coronary artery disease involving native coronary artery of native heart without angina pectoris 7/22/2019    Dizziness and giddiness     Fatigue     Heart attack 2018    High cholesterol     History of retroperitoneal fibrosis     HTN (hypertension)     ICD (implantable cardioverter-defibrillator) battery depletion     Ischemic cardiomyopathy     Ischemic cardiomyopathy 7/22/2019    Kidney disease, chronic, stage II (mild, EGFR 60+ ml/min) 03/10/2011    Kidney disease, chronic, stage III (GFR 30-59 ml/min) 03/2011    Long term current use of antiarrhythmic drug     Paroxysmal atrial fibrillation     Paroxysmal atrial fibrillation 7/22/2019    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 Negative for chills, decreased appetite, diaphoresis, fever, malaise/fatigue, night sweats, weight gain and weight loss.   Eyes: Negative.    Cardiovascular: Positive for dyspnea on exertion. Negative for chest pain, claudication, cyanosis, irregular heartbeat, leg swelling, near-syncope, orthopnea,  "palpitations, paroxysmal nocturnal dyspnea and syncope.   Respiratory: Negative for cough, hemoptysis and shortness of breath.    Endocrine: Negative.    Hematologic/Lymphatic: Negative.    Skin: Negative for color change, dry skin and nail changes.   Musculoskeletal: Negative.    Gastrointestinal: Negative.    Genitourinary: Negative.    Neurological: Negative for weakness.       Objective:   Blood pressure 137/84, pulse (!) 54, height 6' 3" (1.905 m), weight 97.3 kg (214 lb 8.1 oz).body mass index is 26.81 kg/m².  Physical Exam   Constitutional: He appears well-developed.   /84 (BP Location: Left arm, Patient Position: Sitting, BP Method: Medium (Automatic))   Pulse (!) 54   Ht 6' 3" (1.905 m)   Wt 97.3 kg (214 lb 8.1 oz)   BMI 26.81 kg/m²      HENT:   Head: Normocephalic.   Neck: No JVD present. Carotid bruit is not present.   Cardiovascular: Regular rhythm, normal heart sounds and normal pulses. PMI is displaced.   No murmur heard.  Pulmonary/Chest: Effort normal and breath sounds normal. No respiratory distress. He has no wheezes. He has no rales.   Abdominal: Soft. Bowel sounds are normal. He exhibits no distension. There is no tenderness. There is no rebound.   Musculoskeletal: He exhibits no edema.   Neurological: He is alert.   Skin: Skin is warm.   Vitals reviewed.      Labs:    Chemistry        Component Value Date/Time     10/02/2019 0955    K 3.4 (L) 10/02/2019 0955     10/02/2019 0955    CO2 26 10/02/2019 0955    BUN 32 (H) 10/02/2019 0955    CREATININE 2.4 (H) 10/02/2019 0955     10/02/2019 0955        Component Value Date/Time    CALCIUM 8.9 10/02/2019 0955    ALKPHOS 77 10/02/2019 0955    AST 11 10/02/2019 0955    ALT 10 10/02/2019 0955    BILITOT 0.3 10/02/2019 0955    ESTGFRAFRICA 32.0 (A) 10/02/2019 0955    EGFRNONAA 27.7 (A) 10/02/2019 0955          No results found for: MG  Lab Results   Component Value Date    WBC 5.69 10/02/2019    HGB 12.1 (L) 10/02/2019    HCT " 38.9 (L) 10/02/2019     10/02/2019     No results found for: INR, PROTIME  BNP   Date Value Ref Range Status   10/02/2019 716 (H) 0 - 99 pg/mL Final     Comment:     Values of less than 100 pg/ml are consistent with non-CHF populations.   08/07/2019 1,350 (H) 0 - 99 pg/mL Final     Comment:     Values of less than 100 pg/ml are consistent with non-CHF populations.   07/22/2019 1,009 (H) 0 - 99 pg/mL Final     Comment:     Values of less than 100 pg/ml are consistent with non-CHF populations.       Assessment:      1. Chronic combined systolic and diastolic congestive heart failure    2. Ischemic cardiomyopathy    3. Coronary artery disease involving native coronary artery of native heart without angina pectoris    4. Paroxysmal atrial fibrillation    5. Biventricular automatic implantable cardioverter defibrillator in situ        Plan:   Clinically doing well. NYHA class II-III symptoms. Euvolemic on exam.   Continue current HF regimen   Do Not Use BNP to assess volume status as patient is on Entresto  Recommend 2 gram sodium restriction and 1500cc fluid restriction.  Encourage physical activity with graded exercise program.  Requested patient to weigh themselves daily, and to notify us if their weight increases by more than 3 lbs in 1 day or 5 lbs in 1 week.   RTC in 6 months with labs.     Sunny Feldman MD